# Patient Record
Sex: FEMALE | Race: BLACK OR AFRICAN AMERICAN | NOT HISPANIC OR LATINO | Employment: OTHER | URBAN - METROPOLITAN AREA
[De-identification: names, ages, dates, MRNs, and addresses within clinical notes are randomized per-mention and may not be internally consistent; named-entity substitution may affect disease eponyms.]

---

## 2018-02-06 ENCOUNTER — HOSPITAL ENCOUNTER (EMERGENCY)
Facility: OTHER | Age: 63
Discharge: HOME | End: 2018-02-06
Attending: EMERGENCY MEDICINE
Payer: MEDICARE

## 2018-02-06 VITALS
HEIGHT: 65 IN | TEMPERATURE: 98 F | RESPIRATION RATE: 16 BRPM | HEART RATE: 68 BPM | DIASTOLIC BLOOD PRESSURE: 59 MMHG | OXYGEN SATURATION: 97 % | WEIGHT: 167 LBS | BODY MASS INDEX: 27.82 KG/M2 | SYSTOLIC BLOOD PRESSURE: 128 MMHG

## 2018-02-06 DIAGNOSIS — M51.36 DDD (DEGENERATIVE DISC DISEASE), LUMBAR: ICD-10-CM

## 2018-02-06 DIAGNOSIS — M54.9 BACK PAIN: Primary | ICD-10-CM

## 2018-02-06 DIAGNOSIS — S20.229A CONTUSION OF BACK, UNSPECIFIED LATERALITY, INITIAL ENCOUNTER: ICD-10-CM

## 2018-02-06 PROCEDURE — 25000003 PHARM REV CODE 250: Performed by: PHYSICIAN ASSISTANT

## 2018-02-06 PROCEDURE — 99283 EMERGENCY DEPT VISIT LOW MDM: CPT

## 2018-02-06 RX ORDER — FLUTICASONE PROPIONATE 50 MCG
1 SPRAY, SUSPENSION (ML) NASAL DAILY
COMMUNITY
End: 2018-04-22

## 2018-02-06 RX ORDER — TRAMADOL HYDROCHLORIDE 50 MG/1
50 TABLET ORAL EVERY 6 HOURS PRN
Qty: 10 TABLET | Refills: 0 | Status: SHIPPED | OUTPATIENT
Start: 2018-02-06 | End: 2018-02-16

## 2018-02-06 RX ORDER — HYDROCODONE BITARTRATE AND ACETAMINOPHEN 5; 325 MG/1; MG/1
2 TABLET ORAL
Status: COMPLETED | OUTPATIENT
Start: 2018-02-06 | End: 2018-02-06

## 2018-02-06 RX ADMIN — HYDROCODONE BITARTRATE AND ACETAMINOPHEN 2 TABLET: 5; 325 TABLET ORAL at 02:02

## 2018-02-06 NOTE — ED NOTES
"Pt ambulatory with slow but steady gait with Xray technician. Pt refusing to get into wheelchair, stating "oh no that's too low."   "

## 2018-02-06 NOTE — ED NOTES
Appearance: Pt awake, alert & oriented to person, place & time. Pt in no acute distress at present time. Pt is clean and well groomed with clothes appropriately fastened.   Skin: Skin warm, dry & intact. Color consistent with ethnicity. Mucous membranes moist. No breakdown or brusing noted.   Musculoskeletal:SEE HPI. no obvious swelling or deformities noted.   Respiratory: Respirations spontaneous, even, and non-labored. Visible chest rise noted. Airway is open and patent. No accessory muscle use noted.   Neurologic: Sensation is intact. Speech is clear and appropriate. Eyes open spontaneously, behavior appropriate to situation, follows commands, facial expression symmetrical, bilateral hand grasp equal and even, purposeful motor response noted.  Cardiac: All peripheral pulses present. No Bilateral lower extremity edema. Cap refill is <3 seconds. Pt denies active chest pains, SOB, dizziness, blurred vision, weakness or fatigue at this time.   Abdomen: Abdomen soft, non-tender to palpation. Pt denies active abd pains, cramping or discomfort, No N/V/D at this time.   : Pt reports no dysuria or hematuria.

## 2018-02-06 NOTE — ED NOTES
"Pt reporting she was walking outside today, "slipped on something" and fell. Pt now with pain to bilateral lower back. No deformities to site. Pt denies hitting her head or LOC. Denies bladder or bowel dysfunction. Pt reporting difficulty walking. Pt AAOx4 and appropriate at this time. Respirations even and unlabored. No acute distress noted.  "

## 2018-02-07 NOTE — ED PROVIDER NOTES
Encounter Date: 2/6/2018       History     Chief Complaint   Patient presents with    Back Pain     pt slipped and fell to day  now with lower back pain .     Patient is a 63-year-old female with history of glaucoma who presents to the emergency department with back pain after a fall.  [place]  Patient states she slipped in water falling directly on her lower back.  She denies hitting her head or loss of consciousness.  She reports 10 out of 10 pain in her lower back.  She denies radiation of the pain.  She denies saddle anesthesia or bowel or bladder incontinence or retention.        The history is provided by the patient.     Review of patient's allergies indicates:   Allergen Reactions    Contrast media     Penicillins      Past Medical History:   Diagnosis Date    Glaucoma      Past Surgical History:   Procedure Laterality Date    FOOT SURGERY      TUBAL LIGATION      TUMOR REMOVAL Right     Right breast     No family history on file.  Social History   Substance Use Topics    Smoking status: Current Every Day Smoker    Smokeless tobacco: Never Used    Alcohol use No     Review of Systems   Constitutional: Negative for activity change, appetite change, chills, fatigue and fever.   HENT: Negative for congestion, ear discharge, ear pain, postnasal drip, rhinorrhea, sore throat and trouble swallowing.    Eyes: Negative for photophobia and visual disturbance.   Respiratory: Negative for cough and shortness of breath.    Cardiovascular: Negative for chest pain.   Gastrointestinal: Negative for abdominal pain, blood in stool, constipation, diarrhea, nausea and vomiting.   Genitourinary: Negative for dysuria, flank pain and hematuria.   Musculoskeletal: Positive for back pain. Negative for neck pain and neck stiffness.   Skin: Negative for rash and wound.   Neurological: Negative for dizziness, speech difficulty, weakness, light-headedness, numbness and headaches.        Physical Exam     Initial Vitals [02/06/18 1323]   BP Pulse Resp Temp SpO2   125/80 69 18 98.1 °F (36.7 °C) 96 %      MAP       95         Physical Exam    Nursing note and vitals reviewed.  Constitutional: She appears well-developed and well-nourished. She is not diaphoretic.  Non-toxic appearance. No distress.   HENT:   Head: Normocephalic.   Right Ear: Hearing and external ear normal.   Left Ear: Hearing and external ear normal.   Nose: Nose normal.   Mouth/Throat: Uvula is midline, oropharynx is clear and moist and mucous membranes are normal. Mucous membranes are not pale. No trismus in the jaw. No uvula swelling. No oropharyngeal exudate.   Eyes: Conjunctivae are normal. Pupils are equal, round, and reactive to light.   Neck: Normal range of motion.   Cardiovascular: Normal rate and regular rhythm.   Pulmonary/Chest: Breath sounds normal.   Abdominal: Soft. Bowel sounds are normal. There is no tenderness.   Musculoskeletal:   No midline tenderness in the cervical or thoracic region.  There is midline tenderness in the lumbar region.  No ecchymosis.  No step-offs or crepitus.  Bilateral paraspinal tenderness in the lumbar region.  Normal strength in upper and lower extremities.  No saddle anesthesia.  Pt is ambulating.   Lymphadenopathy:     She has no cervical adenopathy.   Neurological: She is alert and oriented to person, place, and time. She has normal strength. No cranial nerve deficit or sensory deficit.   Skin: Skin is warm and dry. Capillary refill takes less than 2 seconds.   Psychiatric: She has a normal mood and affect.         ED Course   Procedures  Labs Reviewed - No data to display       X-Rays:   Independently Interpreted Readings:   Other Readings:  Degenerative disc disease with no acute osseous injury.    Imaging Results          X-Ray Lumbar Spine Ap And Lateral (Final result)  Result time 02/06/18 14:20:36    Final result by Santos Kahn MD (02/06/18 14:20:36)                  Impression:        Mild degenerative changes of the lumbar spine without evidence of acute bony abnormality.      Electronically signed by: JORGITO BERRY MD  Date:     02/06/18  Time:    14:20              Narrative:    Comparison: None    Technique: Lumbar spine AP and lateral.    Findings: No evidence of significant compression fracture or bone destruction.  Mild multilevel intervertebral disc space narrowing with mild retrolisthesis of L5 on S1.  Mild facet arthropathy in the lower lumbar spine.  Mild sclerosis along the superior endplate of L2 may be degenerative.  No significant height loss.  There is mild levoscoliosis on AP view.  There is extensive aortic atherosclerosis.                              Medical Decision Making:   Initial Assessment:   Urgent evaluation of a 63-year-old female with history of glaucoma who presents to the emergency department after a fall.  Patient is afebrile, nontoxic appearing, and hemodynamically stable.  No head injury.  There is midline tenderness of spine.  X-rays obtained and shows no acute osseous injury.  No evidence of spinal cord compression or cauda equina syndrome.  Patient is given pain medication.  She is advised to follow-up with PCP.  She is advised to return to the emergency department with any worsening symptoms or concerns.  Other:   I have discussed this case with another health care provider.       <> Summary of the Discussion: Dr. Whitman                   ED Course      Clinical Impression:   The primary encounter diagnosis was Back pain. Diagnoses of Contusion of back, unspecified laterality, initial encounter and DDD (degenerative disc disease), lumbar were also pertinent to this visit.                           Magdalena Leroy PA-C  02/06/18 1104

## 2018-04-22 ENCOUNTER — HOSPITAL ENCOUNTER (EMERGENCY)
Facility: OTHER | Age: 63
Discharge: HOME | End: 2018-04-22
Attending: EMERGENCY MEDICINE
Payer: MEDICARE

## 2018-04-22 VITALS
WEIGHT: 163 LBS | SYSTOLIC BLOOD PRESSURE: 118 MMHG | DIASTOLIC BLOOD PRESSURE: 58 MMHG | TEMPERATURE: 98 F | HEIGHT: 65 IN | BODY MASS INDEX: 27.16 KG/M2 | OXYGEN SATURATION: 95 % | RESPIRATION RATE: 18 BRPM | HEART RATE: 65 BPM

## 2018-04-22 DIAGNOSIS — M54.9 ACUTE RIGHT-SIDED BACK PAIN, UNSPECIFIED BACK LOCATION: Primary | ICD-10-CM

## 2018-04-22 PROCEDURE — 96372 THER/PROPH/DIAG INJ SC/IM: CPT

## 2018-04-22 PROCEDURE — 99283 EMERGENCY DEPT VISIT LOW MDM: CPT | Mod: 25

## 2018-04-22 PROCEDURE — 63600175 PHARM REV CODE 636 W HCPCS: Performed by: EMERGENCY MEDICINE

## 2018-04-22 PROCEDURE — 25000003 PHARM REV CODE 250: Performed by: EMERGENCY MEDICINE

## 2018-04-22 RX ORDER — BENZONATATE 100 MG/1
100 CAPSULE ORAL 3 TIMES DAILY PRN
COMMUNITY

## 2018-04-22 RX ORDER — TRAMADOL HYDROCHLORIDE 50 MG/1
50 TABLET ORAL
Status: COMPLETED | OUTPATIENT
Start: 2018-04-22 | End: 2018-04-22

## 2018-04-22 RX ORDER — ACETAMINOPHEN 500 MG
500 TABLET ORAL EVERY 8 HOURS
COMMUNITY

## 2018-04-22 RX ORDER — ASPIRIN 81 MG/1
81 TABLET ORAL DAILY
COMMUNITY

## 2018-04-22 RX ORDER — OMEPRAZOLE 20 MG/1
20 CAPSULE, DELAYED RELEASE ORAL DAILY
COMMUNITY

## 2018-04-22 RX ORDER — METHOCARBAMOL 500 MG/1
1000 TABLET, FILM COATED ORAL
Status: COMPLETED | OUTPATIENT
Start: 2018-04-22 | End: 2018-04-22

## 2018-04-22 RX ORDER — KETOROLAC TROMETHAMINE 30 MG/ML
30 INJECTION, SOLUTION INTRAMUSCULAR; INTRAVENOUS
Status: COMPLETED | OUTPATIENT
Start: 2018-04-22 | End: 2018-04-22

## 2018-04-22 RX ORDER — GABAPENTIN 300 MG/1
300 CAPSULE ORAL 3 TIMES DAILY
COMMUNITY

## 2018-04-22 RX ORDER — IBUPROFEN 800 MG/1
800 TABLET ORAL 3 TIMES DAILY PRN
COMMUNITY

## 2018-04-22 RX ORDER — DICLOFENAC SODIUM 25 MG/1
25 TABLET, DELAYED RELEASE ORAL 3 TIMES DAILY PRN
Qty: 20 TABLET | Refills: 0 | Status: SHIPPED | OUTPATIENT
Start: 2018-04-22

## 2018-04-22 RX ORDER — METHOCARBAMOL 500 MG/1
1000 TABLET, FILM COATED ORAL 3 TIMES DAILY PRN
Qty: 20 TABLET | Refills: 0 | Status: SHIPPED | OUTPATIENT
Start: 2018-04-22 | End: 2018-04-27

## 2018-04-22 RX ADMIN — KETOROLAC TROMETHAMINE 30 MG: 30 INJECTION, SOLUTION INTRAMUSCULAR at 12:04

## 2018-04-22 RX ADMIN — TRAMADOL HYDROCHLORIDE 50 MG: 50 TABLET, FILM COATED ORAL at 11:04

## 2018-04-22 RX ADMIN — METHOCARBAMOL 1000 MG: 500 TABLET ORAL at 11:04

## 2018-04-22 NOTE — ED TRIAGE NOTES
Pt c/o right scapular & shoulder pain X 3 days which has progressively worsened. Pt also c/o decreased right shoulder ROM secondary to pain.

## 2018-04-22 NOTE — DISCHARGE INSTRUCTIONS
We have prescribed you medication for pain. Please fill and take as directed.    Please return to the ER if you have chest pain, difficulty breathing, fevers, altered mental status, dizziness, weakness, or any other concerns.      Follow up with your primary care physician.

## 2018-04-22 NOTE — ED NOTES
Patient Identifiers for Erin Kelley checked and correct  LOC: The patient is awake, alert and aware of environment with an appropriate affect, the patient is oriented x 3 and speaking appropriate.  APPEARANCE: Elderly. Patient grimacing with movement of the RUE. Pt uncomfortable. Pt is in no acute distress. The patient is clean and well groomed. The patient's clothing is properly fastened.  SKIN: The skin is warm and dry. The patient has normal skin turgor and moist mucus membranes. No rashes or lesions upon observation. Skin Intact , no breakdown noted.  Musculoskeletal :  Decreased right shoulder range of motion secondary to right scapular pain. No swelling. Palpation tenderness noted upon palpation. No obvious RUE or (R) shoulder deformity. Pt reports sensation of sensation of the RUE intact & WNL at present.  RESPIRATORY: Airway is open and patent, respirations are spontaneous, patient has a normal effort and rate.   PULSES: 2+ radial pulses, symmetrical in all extremities.    Will continue to monitor

## 2018-04-22 NOTE — ED PROVIDER NOTES
Encounter Date: 4/22/2018    SCRIBE #1 NOTE: I, Aleah Cotter, am scribing for, and in the presence of, Dr. French.       History     Chief Complaint   Patient presents with    Back Pain     pt c/o right scapular upper back pain worsening yesterday starting with muscle spasms. Fall x 2 months ago with back pain since fall.      Time seen by provider: 11:22 PM    This is a 63 y.o. female who presents with complaint of right-sided upper back pain that has worsened for the past three days. The patient reports that the pain radiates to her right shoulder down her RUE. She reports associated numbness and tingling that extends from the right shoulder to the right elbow. The patient notes that the pain is exacerbated by movement. The patient's  reports that the patient was moving heavy objects a few days before the onset of pain occurred. The patient denies having these symptoms in the past. The patient denies swelling, neck pain, fever, chills, chest pain, or SOB.       The history is provided by the patient and the spouse.     Review of patient's allergies indicates:   Allergen Reactions    Contrast media     Penicillins     Vicodin [hydrocodone-acetaminophen]      itching     Past Medical History:   Diagnosis Date    GERD (gastroesophageal reflux disease)     Glaucoma      Past Surgical History:   Procedure Laterality Date    FOOT SURGERY      TUBAL LIGATION      TUMOR REMOVAL Right     Right breast     History reviewed. No pertinent family history.  Social History   Substance Use Topics    Smoking status: Current Every Day Smoker    Smokeless tobacco: Never Used    Alcohol use No     Review of Systems   Constitutional: Negative for chills and fever.   Respiratory: Negative for shortness of breath.    Cardiovascular: Negative for chest pain.   Gastrointestinal: Negative for nausea and vomiting.   Musculoskeletal: Positive for arthralgias (right shoulder. right elbow.) and back pain (upper). Negative  for joint swelling and neck pain.        Positive for pain to the RUE.   Neurological: Positive for numbness (RUE.).        Positive for tingling to the RUE.       Physical Exam     Initial Vitals [04/22/18 1105]   BP Pulse Resp Temp SpO2   (!) 110/55 87 14 98.5 °F (36.9 °C) 95 %      MAP       73.33         Physical Exam    Nursing note and vitals reviewed.  Constitutional: She appears well-developed and well-nourished. She is cooperative.  Non-toxic appearance. No distress.   HENT:   Head: Normocephalic and atraumatic.   Eyes: Conjunctivae and EOM are normal.   Neck: Normal range of motion and full passive range of motion without pain. Neck supple.   Cardiovascular: Normal rate, regular rhythm, normal heart sounds and normal pulses.   2+ radial pulses bilaterally.   Pulmonary/Chest: Effort normal and breath sounds normal. No respiratory distress. She has no wheezes. She has no rales.   Abdominal: Normal appearance.   Musculoskeletal: Normal range of motion. She exhibits tenderness.   Tenderness to palpation along the medial edge of right scapula and posterior shoulder. Increased pain with abduction of right upper extremity past 90 degrees, though full ROM intact.  No midline C-spine tenderness to palpation.   Neurological: She is alert and oriented to person, place, and time. She has normal strength. No cranial nerve deficit or sensory deficit.   Sensations intact.  Ambulatory with steady gait.  5/5 strength in all extremities.     Skin: Skin is warm, dry and intact.   Psychiatric: Her speech is normal and behavior is normal.         ED Course   Procedures  Labs Reviewed - No data to display          Medical Decision Making:   History:   Old Medical Records: I decided to obtain old medical records.  Old Records Summarized: other records.  Initial Assessment:   11:22AM:  Pt is a 62 y/o F who presents to ED with R upper back pain with radiation down the RUE. Pt appears well, nontoxic. She does have tenderness just  medial to the R scapula and pain with RUE movement. She is neurovascularly intact.  I do not feel that imaging is indicated at this time.  Will plan for analgesia, will continue to follow and reassess.      1:05PM: Pt doing well, feeling better.  I do believe pain is likely MSK in nature, may be a radicular component for it.  I counseled pt regarding supportive care measures and will provide prescription for NSAIDs and muscle relaxants.  I have discussed with the pt ED return warnings and need for close PCP f/u.  Pt agreeable to plan and all questions answered.  I feel that pt is stable for discharge and management as an outpatient and no further intervention is needed at this time.  Pt is comfortable returning to the ED if needed.  Will DC home in stable condition.                Scribe Attestation:   Scribe #1: I performed the above scribed service and the documentation accurately describes the services I performed. I attest to the accuracy of the note.    Attending Attestation:           Physician Attestation for Scribe:  Physician Attestation Statement for Scribe #1: I, Dr. French, reviewed documentation, as scribed by Aleah Cotter in my presence, and it is both accurate and complete.                    Clinical Impression:     1. Acute right-sided back pain, unspecified back location                             Patt French MD  04/22/18 4515

## 2018-11-19 ENCOUNTER — HOSPITAL ENCOUNTER (EMERGENCY)
Facility: OTHER | Age: 63
Discharge: HOME | End: 2018-11-19
Attending: EMERGENCY MEDICINE
Payer: MEDICARE

## 2018-11-19 VITALS
HEART RATE: 56 BPM | HEIGHT: 65 IN | RESPIRATION RATE: 17 BRPM | WEIGHT: 168 LBS | BODY MASS INDEX: 27.99 KG/M2 | TEMPERATURE: 98 F | SYSTOLIC BLOOD PRESSURE: 100 MMHG | OXYGEN SATURATION: 97 % | DIASTOLIC BLOOD PRESSURE: 54 MMHG

## 2018-11-19 DIAGNOSIS — S01.81XA LACERATION OF FOREHEAD, INITIAL ENCOUNTER: ICD-10-CM

## 2018-11-19 DIAGNOSIS — S09.90XA CLOSED HEAD INJURY, INITIAL ENCOUNTER: Primary | ICD-10-CM

## 2018-11-19 PROCEDURE — 90715 TDAP VACCINE 7 YRS/> IM: CPT | Performed by: EMERGENCY MEDICINE

## 2018-11-19 PROCEDURE — 25000003 PHARM REV CODE 250: Performed by: EMERGENCY MEDICINE

## 2018-11-19 PROCEDURE — 99284 EMERGENCY DEPT VISIT MOD MDM: CPT

## 2018-11-19 PROCEDURE — 63600175 PHARM REV CODE 636 W HCPCS: Performed by: EMERGENCY MEDICINE

## 2018-11-19 PROCEDURE — 12013 RPR F/E/E/N/L/M 2.6-5.0 CM: CPT

## 2018-11-19 PROCEDURE — 90471 IMMUNIZATION ADMIN: CPT | Performed by: EMERGENCY MEDICINE

## 2018-11-19 RX ORDER — LIDOCAINE HYDROCHLORIDE AND EPINEPHRINE 10; 10 MG/ML; UG/ML
10 INJECTION, SOLUTION INFILTRATION; PERINEURAL
Status: COMPLETED | OUTPATIENT
Start: 2018-11-19 | End: 2018-11-19

## 2018-11-19 RX ADMIN — CLOSTRIDIUM TETANI TOXOID ANTIGEN (FORMALDEHYDE INACTIVATED), CORYNEBACTERIUM DIPHTHERIAE TOXOID ANTIGEN (FORMALDEHYDE INACTIVATED), BORDETELLA PERTUSSIS TOXOID ANTIGEN (GLUTARALDEHYDE INACTIVATED), BORDETELLA PERTUSSIS FILAMENTOUS HEMAGGLUTININ ANTIGEN (FORMALDEHYDE INACTIVATED), BORDETELLA PERTUSSIS PERTACTIN ANTIGEN, AND BORDETELLA PERTUSSIS FIMBRIAE 2/3 ANTIGEN 0.5 ML: 5; 2; 2.5; 5; 3; 5 INJECTION, SUSPENSION INTRAMUSCULAR at 02:11

## 2018-11-19 RX ADMIN — LIDOCAINE HYDROCHLORIDE,EPINEPHRINE BITARTRATE 10 ML: 10; .01 INJECTION, SOLUTION INFILTRATION; PERINEURAL at 02:11

## 2018-11-19 NOTE — ED NOTES
"Applied steri strips to pt. Pt uncooperative while cleaning off dried blood to forehead. Informed pt that the dried blood may make steri strips come off if it is not cleaned off. Pt states "i dont care its burning."   "

## 2018-11-19 NOTE — ED TRIAGE NOTES
Pt arrives to ED with c/o fall and laceration. Pt reports falling around 0100 on side walk and hitting head on ground, reports drinking, appears to be intoxicated, smells of ETOH. Pt agitated, anxious, and uncooperative at this time. Pt lying in ED bed, respirations even, unlabored, eyes open spontaneously, NAD noted, AAOX4. Pt placed on BP cycle, and pulse ox.

## 2018-11-19 NOTE — ED NOTES
Pt lying in bed comfortably, respirations even, unlabored, eyes open spontaneously, NAD noted. Pt updated on plan of care, Will continue to monitor with bp cycling and pulse ox

## 2018-11-19 NOTE — ED NOTES
Pt lying in bed on left side, respirations even, unlabored, appears to be sleeping, eyes closed, NAD noted, call bell within reach. will continue to monitor with BP cycling and pulse ox

## 2018-11-19 NOTE — ED PROVIDER NOTES
Encounter Date: 11/19/2018    SCRIBE #1 NOTE: I, Yeny Cohn, am scribing for, and in the presence of, Dr. Lee.       History     Chief Complaint   Patient presents with    Fall     +fall 20 min PTA. pt fell on concrete while out drinking with family. pt appears to be intoxicated but is aaox3. bleeding controlled upon arrival.      Time seen by provider: 1:31 AM    This is a 63 y.o. female who presents s/p fall that occurred twenty minutes prior to arrival. The patient states she tripped and fell onto concrete and hit her head. She reports a laceration to the forehead but denies loss of consciousness. She denies nausea, vomiting, headache, extremity pain, neck pain, dizziness, light headedness, numbness or weakness. Patient admits to use of alcohol tonight.      The history is provided by the patient.     Review of patient's allergies indicates:   Allergen Reactions    Contrast media     Penicillins     Vicodin [hydrocodone-acetaminophen]      itching     Past Medical History:   Diagnosis Date    GERD (gastroesophageal reflux disease)     Glaucoma      Past Surgical History:   Procedure Laterality Date    FOOT SURGERY      TUBAL LIGATION      TUMOR REMOVAL Right     Right breast     No family history on file.  Social History     Tobacco Use    Smoking status: Current Every Day Smoker    Smokeless tobacco: Never Used   Substance Use Topics    Alcohol use: No    Drug use: No     Review of Systems   Constitutional: Negative for chills and fever.   HENT: Negative for congestion, rhinorrhea, sore throat and trouble swallowing.    Eyes: Negative for visual disturbance.   Respiratory: Negative for shortness of breath.    Cardiovascular: Negative for chest pain.   Gastrointestinal: Negative for abdominal pain, nausea and vomiting.   Endocrine: Negative for polyuria.   Genitourinary: Negative for difficulty urinating and dysuria.   Musculoskeletal: Negative for back pain and neck pain.   Skin: Positive for  wound.   Neurological: Negative for syncope, weakness, numbness and headaches.   Psychiatric/Behavioral: Negative for confusion.       Physical Exam     Initial Vitals   BP Pulse Resp Temp SpO2   11/19/18 0136 11/19/18 0132 11/19/18 0136 11/19/18 0136 11/19/18 0132   (!) 122/56 66 18 97.6 °F (36.4 °C) 100 %      MAP       --                Physical Exam    Nursing note and vitals reviewed.  Constitutional: She appears well-developed and well-nourished. No distress.   HENT:   Head: Normocephalic.   Right Ear: External ear normal.   Left Ear: External ear normal.   3 cm laceration right forehead. Doesn't involve the eyebrow.   Abrasion to bridge of nose. No lacerations.    Eyes: Pupils are equal, round, and reactive to light. Right eye exhibits nystagmus. Left eye exhibits nystagmus.   Neck: Normal range of motion. Neck supple.   Cardiovascular: Normal rate, regular rhythm and normal heart sounds. Exam reveals no gallop and no friction rub.    No murmur heard.  Pulmonary/Chest: Breath sounds normal. No respiratory distress. She has no wheezes. She has no rhonchi. She has no rales.   Abdominal: Soft. There is no tenderness. There is no rebound and no guarding.   Musculoskeletal: Normal range of motion. She exhibits no edema or tenderness.   Moves all extremities without pain.   Neurological: She is alert and oriented to person, place, and time. She has normal strength. GCS score is 15. GCS eye subscore is 4. GCS verbal subscore is 5. GCS motor subscore is 6.   Slurred speech.   Skin:   No skin rashes or lesions below neck.         ED Course   Lac Repair  Date/Time: 11/19/2018 5:44 AM  Performed by: Rodger Lee MD  Authorized by: Rodger Lee MD   Body area: head/neck  Location details: forehead  Laceration length: 3 cm  Foreign bodies: no foreign bodies  Tendon involvement: none  Nerve involvement: none  Vascular damage: no  Skin closure: Steri-Strips  Dressing: 4x4 sterile gauze  Patient tolerance: Patient  tolerated the procedure well with no immediate complications  Comments: Patient refuses sutures due to a fear of needles .        Labs Reviewed - No data to display       Imaging Results          CT Head Without Contrast (Final result)  Result time 11/19/18 02:22:52    Final result by Aureliano Bernal MD (11/19/18 02:22:52)                 Impression:      Frontal scalp soft tissue injury.  No CT evidence of acute intracranial abnormality.      Electronically signed by: Aureliano Bernal MD  Date:    11/19/2018  Time:    02:22             Narrative:    EXAMINATION:  CT HEAD WITHOUT CONTRAST    CLINICAL HISTORY:  Head trauma, minor, GCS>=13, NOC/NEXUS/CCR positive, first study;    TECHNIQUE:  Low dose axial images were obtained through the head.  Coronal and sagittal reformations were also performed. Contrast was not administered.    COMPARISON:  None.    FINDINGS:  No evidence of acute territorial infarct, hemorrhage, mass effect, or midline shift.    Ventricles are normal in size and configuration.    There is mild right frontal scalp soft tissue injury and trace subcutaneous emphysema suggestive of laceration.  No displaced calvarial fracture.    Visualized paranasal sinuses and mastoid air cells are clear.                                 Medical Decision Making:   Clinical Tests:   Radiological Study: Ordered and Reviewed  ED Management:  Intoxicated patient presents with a head injury and forehead laceration after she fell.  CT scan of her head is negative. She has no other injuries on thorough examination. She does have a sizable laceration on her forehead.  It is cleaned but the patient is very agitated about not receiving sutures..  We allow her to rest several hours then re-evaluate.  She continues to refuse any sutures as she is afraid of needles.  I have discussed with the possibility for increased risk of scarring and infection without sutures.  She expresses understanding with this, acknowledges the  risk, but cannot stand the idea of having stitches.  Nursing places Steri-Strips to approximate the closure and covered with gauze.  Patient has a sober friend who can come pick her up.    I did have an extensive talk regarding signs to return for and need for follow up. Patient expressed understanding and will monitor symptoms closely and follow-up as needed.    MANDA Lee M.D.  11/19/2018  5:46 AM              Scribe Attestation:   Scribe #1: I performed the above scribed service and the documentation accurately describes the services I performed. I attest to the accuracy of the note.    Attending Attestation:           Physician Attestation for Scribe:  Physician Attestation Statement for Scribe #1: I, Dr. Lee, reviewed documentation, as scribed by Yeny Cohn in my presence, and it is both accurate and complete.                    Clinical Impression:     1. Closed head injury, initial encounter    2. Laceration of forehead, initial encounter                                 Rodger Lee MD  11/19/18 9882

## 2018-11-19 NOTE — ED NOTES
Pt lying in bed, respirations even, unlabored, eyes open spontaneously, call bell within reach, visitor at bedside. Pt updated on plan of care, will continue to monitor with BP cycling and pulse ox

## 2018-11-19 NOTE — ED NOTES
Pt arouses to repeated verbal stimuli, oriented to place and person and re-oriented to time,place and situation.  Pt falls fast asleep after 5 seconds.  Will continue to monitor.

## 2018-11-19 NOTE — ED NOTES
"Pt requesting to go to the bathroom. While assisting pt to bathroom, pt sat on ground, refused to stand up, stating "i am weak." assisted up by 2 RNs and assisted to bed. Ride rails up X2, call bell within reach, visitor at bedside.  "

## 2018-11-19 NOTE — ED NOTES
at bedside, pt sitting up in bed drinking apple juice, eating sandwich, oriented x's 4, skin warm and dry, speech clear and steady gait, will discharge to home

## 2020-06-12 DIAGNOSIS — Z72.0 TOBACCO USER: ICD-10-CM

## 2020-06-12 DIAGNOSIS — F17.200 TOBACCO USE DISORDER: Primary | ICD-10-CM

## 2020-11-13 ENCOUNTER — LAB VISIT (OUTPATIENT)
Dept: LAB | Facility: OTHER | Age: 65
End: 2020-11-13
Payer: MEDICARE

## 2020-11-13 DIAGNOSIS — Z03.818 ENCOUNTER FOR OBSERVATION FOR SUSPECTED EXPOSURE TO OTHER BIOLOGICAL AGENTS RULED OUT: ICD-10-CM

## 2020-11-13 PROCEDURE — U0003 INFECTIOUS AGENT DETECTION BY NUCLEIC ACID (DNA OR RNA); SEVERE ACUTE RESPIRATORY SYNDROME CORONAVIRUS 2 (SARS-COV-2) (CORONAVIRUS DISEASE [COVID-19]), AMPLIFIED PROBE TECHNIQUE, MAKING USE OF HIGH THROUGHPUT TECHNOLOGIES AS DESCRIBED BY CMS-2020-01-R: HCPCS

## 2020-11-15 LAB — SARS-COV-2 RNA RESP QL NAA+PROBE: NOT DETECTED

## 2021-03-15 DIAGNOSIS — R29.898 WEAKNESS OF RIGHT UPPER EXTREMITY: ICD-10-CM

## 2021-03-15 DIAGNOSIS — M79.601 PAIN OF RIGHT UPPER EXTREMITY: Primary | ICD-10-CM

## 2021-03-30 ENCOUNTER — PROCEDURE VISIT (OUTPATIENT)
Dept: NEUROLOGY | Facility: CLINIC | Age: 66
End: 2021-03-30
Payer: MEDICARE

## 2021-03-30 DIAGNOSIS — R29.898 WEAKNESS OF RIGHT UPPER EXTREMITY: ICD-10-CM

## 2021-03-30 DIAGNOSIS — M79.601 PAIN OF RIGHT UPPER EXTREMITY: ICD-10-CM

## 2021-03-30 PROCEDURE — 95913 NRV CNDJ TEST 13/> STUDIES: CPT | Mod: S$GLB,,, | Performed by: PSYCHIATRY & NEUROLOGY

## 2021-03-30 PROCEDURE — 95913 PR NERVE CONDUCTION STUDY; 13 OR MORE STUDIES: ICD-10-PCS | Mod: S$GLB,,, | Performed by: PSYCHIATRY & NEUROLOGY

## 2021-04-26 ENCOUNTER — PATIENT MESSAGE (OUTPATIENT)
Dept: RESEARCH | Facility: HOSPITAL | Age: 66
End: 2021-04-26

## 2022-05-02 ENCOUNTER — HOSPITAL ENCOUNTER (OUTPATIENT)
Facility: OTHER | Age: 67
Discharge: HOME | End: 2022-05-03
Attending: EMERGENCY MEDICINE | Admitting: EMERGENCY MEDICINE
Payer: MEDICARE

## 2022-05-02 DIAGNOSIS — M54.9 MUSCULOSKELETAL BACK PAIN: Primary | ICD-10-CM

## 2022-05-02 DIAGNOSIS — T88.7XXA MEDICATION SIDE EFFECT: ICD-10-CM

## 2022-05-02 LAB
ALBUMIN SERPL BCP-MCNC: 3.7 G/DL (ref 3.5–5.2)
ALP SERPL-CCNC: 89 U/L (ref 55–135)
ALT SERPL W/O P-5'-P-CCNC: 13 U/L (ref 10–44)
ANION GAP SERPL CALC-SCNC: 12 MMOL/L (ref 8–16)
AST SERPL-CCNC: 18 U/L (ref 10–40)
BASOPHILS # BLD AUTO: 0.03 K/UL (ref 0–0.2)
BASOPHILS NFR BLD: 0.4 % (ref 0–1.9)
BILIRUB SERPL-MCNC: 0.5 MG/DL (ref 0.1–1)
BUN SERPL-MCNC: 12 MG/DL (ref 8–23)
CALCIUM SERPL-MCNC: 9.4 MG/DL (ref 8.7–10.5)
CHLORIDE SERPL-SCNC: 106 MMOL/L (ref 95–110)
CK SERPL-CCNC: 190 U/L (ref 20–180)
CO2 SERPL-SCNC: 20 MMOL/L (ref 23–29)
CREAT SERPL-MCNC: 0.7 MG/DL (ref 0.5–1.4)
CTP QC/QA: YES
DIFFERENTIAL METHOD: ABNORMAL
EOSINOPHIL # BLD AUTO: 0.1 K/UL (ref 0–0.5)
EOSINOPHIL NFR BLD: 0.8 % (ref 0–8)
ERYTHROCYTE [DISTWIDTH] IN BLOOD BY AUTOMATED COUNT: 13.1 % (ref 11.5–14.5)
EST. GFR  (AFRICAN AMERICAN): >60 ML/MIN/1.73 M^2
EST. GFR  (NON AFRICAN AMERICAN): >60 ML/MIN/1.73 M^2
GLUCOSE SERPL-MCNC: 91 MG/DL (ref 70–110)
HCT VFR BLD AUTO: 37.2 % (ref 37–48.5)
HGB BLD-MCNC: 12.1 G/DL (ref 12–16)
IMM GRANULOCYTES # BLD AUTO: 0.01 K/UL (ref 0–0.04)
IMM GRANULOCYTES NFR BLD AUTO: 0.1 % (ref 0–0.5)
LYMPHOCYTES # BLD AUTO: 2 K/UL (ref 1–4.8)
LYMPHOCYTES NFR BLD: 27.5 % (ref 18–48)
MCH RBC QN AUTO: 31.6 PG (ref 27–31)
MCHC RBC AUTO-ENTMCNC: 32.5 G/DL (ref 32–36)
MCV RBC AUTO: 97 FL (ref 82–98)
MONOCYTES # BLD AUTO: 0.8 K/UL (ref 0.3–1)
MONOCYTES NFR BLD: 11.7 % (ref 4–15)
NEUTROPHILS # BLD AUTO: 4.3 K/UL (ref 1.8–7.7)
NEUTROPHILS NFR BLD: 59.5 % (ref 38–73)
NRBC BLD-RTO: 0 /100 WBC
PLATELET # BLD AUTO: 257 K/UL (ref 150–450)
PMV BLD AUTO: 11 FL (ref 9.2–12.9)
POTASSIUM SERPL-SCNC: 4.3 MMOL/L (ref 3.5–5.1)
PROT SERPL-MCNC: 7.5 G/DL (ref 6–8.4)
RBC # BLD AUTO: 3.83 M/UL (ref 4–5.4)
SARS-COV-2 RDRP RESP QL NAA+PROBE: NEGATIVE
SODIUM SERPL-SCNC: 138 MMOL/L (ref 136–145)
TROPONIN I SERPL DL<=0.01 NG/ML-MCNC: 0.01 NG/ML (ref 0–0.03)
TROPONIN I SERPL DL<=0.01 NG/ML-MCNC: 0.01 NG/ML (ref 0–0.03)
WBC # BLD AUTO: 7.19 K/UL (ref 3.9–12.7)

## 2022-05-02 PROCEDURE — 84484 ASSAY OF TROPONIN QUANT: CPT | Performed by: PHYSICIAN ASSISTANT

## 2022-05-02 PROCEDURE — 84484 ASSAY OF TROPONIN QUANT: CPT | Mod: 91 | Performed by: PHYSICIAN ASSISTANT

## 2022-05-02 PROCEDURE — 25000003 PHARM REV CODE 250: Performed by: PHYSICIAN ASSISTANT

## 2022-05-02 PROCEDURE — 80053 COMPREHEN METABOLIC PANEL: CPT | Performed by: PHYSICIAN ASSISTANT

## 2022-05-02 PROCEDURE — 93010 ELECTROCARDIOGRAM REPORT: CPT | Mod: ,,, | Performed by: INTERNAL MEDICINE

## 2022-05-02 PROCEDURE — 85025 COMPLETE CBC W/AUTO DIFF WBC: CPT | Performed by: PHYSICIAN ASSISTANT

## 2022-05-02 PROCEDURE — 93005 ELECTROCARDIOGRAM TRACING: CPT

## 2022-05-02 PROCEDURE — 93010 EKG 12-LEAD: ICD-10-PCS | Mod: ,,, | Performed by: INTERNAL MEDICINE

## 2022-05-02 PROCEDURE — G0378 HOSPITAL OBSERVATION PER HR: HCPCS

## 2022-05-02 PROCEDURE — 82550 ASSAY OF CK (CPK): CPT | Performed by: PHYSICIAN ASSISTANT

## 2022-05-02 PROCEDURE — 99285 EMERGENCY DEPT VISIT HI MDM: CPT | Mod: 25

## 2022-05-02 PROCEDURE — 94761 N-INVAS EAR/PLS OXIMETRY MLT: CPT

## 2022-05-02 PROCEDURE — 96360 HYDRATION IV INFUSION INIT: CPT

## 2022-05-02 PROCEDURE — U0002 COVID-19 LAB TEST NON-CDC: HCPCS | Performed by: PHYSICIAN ASSISTANT

## 2022-05-02 RX ORDER — TALC
6 POWDER (GRAM) TOPICAL NIGHTLY PRN
Status: DISCONTINUED | OUTPATIENT
Start: 2022-05-02 | End: 2022-05-03 | Stop reason: HOSPADM

## 2022-05-02 RX ORDER — ASPIRIN 81 MG/1
81 TABLET ORAL DAILY
Status: DISCONTINUED | OUTPATIENT
Start: 2022-05-03 | End: 2022-05-03 | Stop reason: HOSPADM

## 2022-05-02 RX ORDER — KETOROLAC TROMETHAMINE 30 MG/ML
15 INJECTION, SOLUTION INTRAMUSCULAR; INTRAVENOUS EVERY 6 HOURS PRN
Status: DISCONTINUED | OUTPATIENT
Start: 2022-05-02 | End: 2022-05-02

## 2022-05-02 RX ORDER — ALBUTEROL SULFATE 90 UG/1
AEROSOL, METERED RESPIRATORY (INHALATION)
COMMUNITY
Start: 2022-04-19

## 2022-05-02 RX ORDER — KETOROLAC TROMETHAMINE 10 MG/1
10 TABLET, FILM COATED ORAL EVERY 6 HOURS PRN
Status: DISCONTINUED | OUTPATIENT
Start: 2022-05-02 | End: 2022-05-03 | Stop reason: HOSPADM

## 2022-05-02 RX ORDER — GABAPENTIN 300 MG/1
300 CAPSULE ORAL 3 TIMES DAILY PRN
Status: DISCONTINUED | OUTPATIENT
Start: 2022-05-02 | End: 2022-05-03 | Stop reason: HOSPADM

## 2022-05-02 RX ORDER — SODIUM CHLORIDE 9 MG/ML
1000 INJECTION, SOLUTION INTRAVENOUS CONTINUOUS
Status: ACTIVE | OUTPATIENT
Start: 2022-05-02 | End: 2022-05-03

## 2022-05-02 RX ORDER — BUPROPION HYDROCHLORIDE 150 MG/1
TABLET, EXTENDED RELEASE ORAL
COMMUNITY
Start: 2022-01-04

## 2022-05-02 RX ORDER — AZELASTINE 1 MG/ML
1 SPRAY, METERED NASAL 2 TIMES DAILY
COMMUNITY
Start: 2022-03-08

## 2022-05-02 RX ORDER — ACETAMINOPHEN 325 MG/1
650 TABLET ORAL EVERY 8 HOURS PRN
Status: DISCONTINUED | OUTPATIENT
Start: 2022-05-02 | End: 2022-05-03 | Stop reason: HOSPADM

## 2022-05-02 RX ORDER — LIDOCAINE 50 MG/G
1 PATCH TOPICAL
Status: DISCONTINUED | OUTPATIENT
Start: 2022-05-02 | End: 2022-05-03 | Stop reason: HOSPADM

## 2022-05-02 RX ORDER — ERGOCALCIFEROL 1.25 MG/1
50000 CAPSULE ORAL
COMMUNITY
Start: 2022-04-26

## 2022-05-02 RX ORDER — SODIUM CHLORIDE 0.9 % (FLUSH) 0.9 %
10 SYRINGE (ML) INJECTION
Status: DISCONTINUED | OUTPATIENT
Start: 2022-05-02 | End: 2022-05-03 | Stop reason: HOSPADM

## 2022-05-02 RX ORDER — TIZANIDINE 2 MG/1
2 TABLET ORAL 3 TIMES DAILY
COMMUNITY
Start: 2022-04-08

## 2022-05-02 RX ORDER — PANTOPRAZOLE SODIUM 40 MG/1
40 TABLET, DELAYED RELEASE ORAL DAILY
COMMUNITY
Start: 2022-04-20

## 2022-05-02 RX ORDER — PANTOPRAZOLE SODIUM 40 MG/1
40 TABLET, DELAYED RELEASE ORAL DAILY
Status: DISCONTINUED | OUTPATIENT
Start: 2022-05-03 | End: 2022-05-03 | Stop reason: HOSPADM

## 2022-05-02 RX ORDER — TIMOLOL MALEATE 5 MG/ML
SOLUTION/ DROPS OPHTHALMIC
COMMUNITY
Start: 2022-04-19

## 2022-05-02 RX ORDER — DICLOFENAC SODIUM 10 MG/G
GEL TOPICAL 4 TIMES DAILY
COMMUNITY
Start: 2022-02-01

## 2022-05-02 RX ORDER — CYCLOBENZAPRINE HCL 10 MG
10 TABLET ORAL 3 TIMES DAILY PRN
Status: DISCONTINUED | OUTPATIENT
Start: 2022-05-02 | End: 2022-05-03 | Stop reason: HOSPADM

## 2022-05-02 RX ORDER — ATORVASTATIN CALCIUM 20 MG/1
20 TABLET, FILM COATED ORAL DAILY
COMMUNITY
Start: 2022-04-27 | End: 2022-05-02

## 2022-05-02 RX ADMIN — CYCLOBENZAPRINE 10 MG: 10 TABLET, FILM COATED ORAL at 08:05

## 2022-05-02 RX ADMIN — SODIUM CHLORIDE 1000 ML: 0.9 INJECTION, SOLUTION INTRAVENOUS at 07:05

## 2022-05-02 NOTE — ED TRIAGE NOTES
Pt states she was given lipitor by her doctor without her knowing, pt states she read up on the side effects and doesn't like the side effects it gives. Pt reporting pain to the neck and down under her left breast. Pt is alert and oriented, ambulatory, respirations even, unlabored. Will continue to monitor.

## 2022-05-02 NOTE — H&P
ED Observation Unit  History and Physical      I assumed care of this patient from the Main ED at onset of observation time, 5:16 PM on 05/02/2022.       History of Present Illness:    Erin Kelley is a 67 y.o. female with PMH of glaucoma and GERD presenting with  possible allergic reaction.  Patient states that she had read side effect warnings of statins and hence did not want to try.  She states that her doctor wrote her for this.  She states that she had 2 doses.  She states since this time she noted some left upper back pain that radiated to left chest.  She also reported possible rash to right forearm.  She took Benadryl shortly before arrival with improvement rash.  She does continue with left upper back pain.  Denies any recent falls or trauma.  States that she was concerned about possible cause of her heart given the radiation.  Denies any shortness of breath, nausea, vomiting or diaphoresis.  She is a daily smoker         I reviewed the ED Provider Note dated 05/02/2022  prior to my evaluation of this patient.  I reviewed all labs and imaging performed in the Main ED, prior to patient being placed in Observation. Patient was placed in the ED Observation Unit for upper back pain; atypical chest pain    Initial workup reveals mildly elevated CPK.  Chest x-ray with mild congestion.  No overt cardiomegaly.  Initial troponin unremarkable. Modest improvement in pain    PMHx   Past Medical History:   Diagnosis Date    GERD (gastroesophageal reflux disease)     Glaucoma       Past Surgical History:   Procedure Laterality Date    FOOT SURGERY      TUBAL LIGATION      TUMOR REMOVAL Right     Right breast        Family Hx   History reviewed. No pertinent family history.     Social Hx   Social History     Socioeconomic History    Marital status: Unknown   Tobacco Use    Smoking status: Current Every Day Smoker    Smokeless tobacco: Never Used   Substance and Sexual Activity    Alcohol use: No    Drug use:  No        Vital Signs   Vitals:    05/02/22 1324   BP: 106/66   Pulse: 60   Resp: 18   Temp: 98.6 °F (37 °C)   SpO2: 100%        Review of Systems  ROS: As per HPI and below:  Constitutional: No fever.  No chills.  Eyes: No visual changes.   ENT: No sore throat. No ear pain.  Urinary: No abnormal urination.  Cardiac:  Left-sided chest pain  Respiratory:  No shortness of breath, no wheezing  MSK:  Left upper back pain; radiates to left chest  Integument: No rashes or lesions.      Physical Exam  /66   Pulse 60   Temp 98.6 °F (37 °C)   Resp 18   SpO2 100%   Nursing note and vital signs reviewed.  Constitutional: No acute distress.  Eyes: No conjunctival injection.  Extraocular muscles are intact.  ENT: Normal phonation.  Musculoskeletal:  Tenderness palpation of left trapezius muscle with some edema noted.  No midline tenderness or step-off.  No rash appreciated.  No obvious bony deformity.  Cardiac:  Heart regular rate rhythm  Respiratory:  Lungs CTA  Skin: No rashes seen.  Good turgor.  No abrasions.  No ecchymoses.  Psych: Appropriate, conversant.      Medications:   Scheduled Meds:   [START ON 5/3/2022] aspirin  81 mg Oral Daily    LIDOcaine  1 patch Transdermal Q24H    [START ON 5/3/2022] pantoprazole  40 mg Oral Daily     Continuous Infusions:  PRN Meds:.acetaminophen, cyclobenzaprine, gabapentin, ketorolac, melatonin, sodium chloride 0.9%      Assessment/Plan:  1. Left-sided chest pain    2. Pain    3. Left-sided back pain      1. Left-sided chest pain-atypical likely musculoskeletal.  Plan for serial troponin given risk factors. Mild perihilar congestion on chest x-ray although no signs of cardiomegaly.  Plan to discharge home with serial troponins unremarkable    2. Left-sided back pain-likely muscular in etiology.  Plan for symptomatic medications and lidocaine patch.  Plan for symptomatic medications if workup unremarkable

## 2022-05-02 NOTE — Clinical Note
Diagnosis: Left-sided back pain [[id number]]   Future Attending Provider: GRACIELA CARUSO II [[id number]]   Is the patient being sent to ED Observation?: Yes   Admitting Provider:: GRACIELA CARUSO II [[id number]]   Special Needs:: No Special Needs [1]

## 2022-05-02 NOTE — ED PROVIDER NOTES
Source of History:  patient    Chief complaint:  Allergic Reaction (Started taking atorvastatin last week, although she has a known allergy. Pt reports itching, took 25MG of benadryl this morning )      HPI:  Erin Kelley is a 67 y.o. female with PMH of glaucoma and GERD presenting with  possible allergic reaction.  Patient states that she had read side effect warnings of statins and hence did not want to try.  She states that her doctor wrote her for this.  She states that she had 2 doses.  She states since this time she noted some left upper back pain that radiated to left chest.  She also reported possible rash to right forearm.  She took Benadryl shortly before arrival with improvement rash.  She does continue with left upper back pain.  Denies any recent falls or trauma.  States that she was concerned about possible cause of her heart given the radiation.  Denies any shortness of breath, nausea, vomiting or diaphoresis.  She is a daily smoker    This is the extent to the patients complaints today here in the emergency department.    ROS: As per HPI and below:  Constitutional: No fever.  No chills.  Eyes: No visual changes.   ENT: No sore throat. No ear pain.  Urinary: No abnormal urination.  Cardiac:  Left-sided chest pain  Respiratory:  No shortness of breath, no wheezing  MSK:  Left upper back pain; radiates to left chest  Integument: No rashes or lesions.    Review of patient's allergies indicates:   Allergen Reactions    Contrast media     Penicillins     Vicodin [hydrocodone-acetaminophen]      itching       PMH:  As per HPI and below:  Past Medical History:   Diagnosis Date    GERD (gastroesophageal reflux disease)     Glaucoma      Past Surgical History:   Procedure Laterality Date    FOOT SURGERY      TUBAL LIGATION      TUMOR REMOVAL Right     Right breast       Social History     Tobacco Use    Smoking status: Current Every Day Smoker    Smokeless tobacco: Never Used   Substance Use  Topics    Alcohol use: No    Drug use: No       Physical Exam:    /66   Pulse 60   Temp 98.6 °F (37 °C)   Resp 18   SpO2 100%   Nursing note and vital signs reviewed.  Constitutional: No acute distress.  Eyes: No conjunctival injection.  Extraocular muscles are intact.  ENT: Normal phonation.  Musculoskeletal:  Tenderness palpation of left trapezius muscle with some edema noted.  No midline tenderness or step-off.  No rash appreciated.  No obvious bony deformity.  Cardiac:  Heart regular rate rhythm  Respiratory:  Lungs CTA  Skin: No rashes seen.  Good turgor.  No abrasions.  No ecchymoses.  Psych: Appropriate, conversant.        I decided to obtain the patient's medical records.    Results for orders placed or performed during the hospital encounter of 05/02/22   CBC auto differential   Result Value Ref Range    WBC 7.19 3.90 - 12.70 K/uL    RBC 3.83 (L) 4.00 - 5.40 M/uL    Hemoglobin 12.1 12.0 - 16.0 g/dL    Hematocrit 37.2 37.0 - 48.5 %    MCV 97 82 - 98 fL    MCH 31.6 (H) 27.0 - 31.0 pg    MCHC 32.5 32.0 - 36.0 g/dL    RDW 13.1 11.5 - 14.5 %    Platelets 257 150 - 450 K/uL    MPV 11.0 9.2 - 12.9 fL    Immature Granulocytes 0.1 0.0 - 0.5 %    Gran # (ANC) 4.3 1.8 - 7.7 K/uL    Immature Grans (Abs) 0.01 0.00 - 0.04 K/uL    Lymph # 2.0 1.0 - 4.8 K/uL    Mono # 0.8 0.3 - 1.0 K/uL    Eos # 0.1 0.0 - 0.5 K/uL    Baso # 0.03 0.00 - 0.20 K/uL    nRBC 0 0 /100 WBC    Gran % 59.5 38.0 - 73.0 %    Lymph % 27.5 18.0 - 48.0 %    Mono % 11.7 4.0 - 15.0 %    Eosinophil % 0.8 0.0 - 8.0 %    Basophil % 0.4 0.0 - 1.9 %    Differential Method Automated    Comprehensive metabolic panel   Result Value Ref Range    Sodium 138 136 - 145 mmol/L    Potassium 4.3 3.5 - 5.1 mmol/L    Chloride 106 95 - 110 mmol/L    CO2 20 (L) 23 - 29 mmol/L    Glucose 91 70 - 110 mg/dL    BUN 12 8 - 23 mg/dL    Creatinine 0.7 0.5 - 1.4 mg/dL    Calcium 9.4 8.7 - 10.5 mg/dL    Total Protein 7.5 6.0 - 8.4 g/dL    Albumin 3.7 3.5 - 5.2 g/dL     Total Bilirubin 0.5 0.1 - 1.0 mg/dL    Alkaline Phosphatase 89 55 - 135 U/L    AST 18 10 - 40 U/L    ALT 13 10 - 44 U/L    Anion Gap 12 8 - 16 mmol/L    eGFR if African American >60 >60 mL/min/1.73 m^2    eGFR if non African American >60 >60 mL/min/1.73 m^2   CPK   Result Value Ref Range     (H) 20 - 180 U/L   Troponin I   Result Value Ref Range    Troponin I 0.006 0.000 - 0.026 ng/mL   POCT COVID-19 Rapid Screening   Result Value Ref Range    POC Rapid COVID Negative Negative     Acceptable Yes      Imaging Results          X-Ray Chest AP Portable (Final result)  Result time 05/02/22 14:06:40    Final result by Margaret Leyva MD (05/02/22 14:06:40)                 Impression:      Prominent perihilar pulmonary vasculature without overt edema.      Electronically signed by: Margaret Leyva  Date:    05/02/2022  Time:    14:06             Narrative:    EXAMINATION:  XR CHEST AP PORTABLE    CLINICAL HISTORY:  Pain, unspecified    TECHNIQUE:  Single frontal view of the chest was performed.    COMPARISON:  None    FINDINGS:  Lungs are well expanded.  No acute consolidation, pleural effusion, or pneumothorax.    Mildly prominent right hilar contour may represent pulmonary vasculature due to rotation of the patient.  Mild perihilar pulmonary vascular congestion.    Cardiac silhouette is normal in size.                                MDM:     Erin Kelley 67 y.o. presented to the ED with c/o left upper back and chest pain Physical exam reveals patient well-appearing in no distress.  No rash appreciated.  Tenderness palpation of left trapezius muscle.  No midline tenderness.  No crepitus or obvious bony deformity of chest.  Lungs CTA; heart regular rate rhythm    Differential Diagnosis includes, but is not limited to:  ACS/MI, PE, aortic dissection, pneumothorax, cardiac tamponade, pericarditis/myocarditis, pneumonia, infection/abscess, lung mass, trauma/fracture, costochondritis/pleurisy, MSK  pain/contusion, GERD, biliary disease, pancreatitis, anemia, myalgias, adverse reaction to medication    ED management:  Labs given location of pain although low suspicion of true allergic reaction.  Could be related to muscle breakdown related to statins although lower suspicion giving the unilateral component of it.  More likely related to muscle spasm however as she does have some radiation to chest and has some cardiac risk factors will obtain EKG and chest x-ray.  Chest x-ray with some subtle congestion although no obvious cardiomegaly.  CPK mildly elevated.  EKG with nonspecific ST wave changes.  No previous to compare.  Did discuss all results with patient that symptoms were likely related to muscle spasm and will treat as such however given the atypical presentation and risk factors will plan for observation with serial troponins.  Patient is agreeable plan.  Did discuss with provider in observation unit and she is agreeable to placed in observation.      ED Course as of 05/03/22 1130   Mon May 02, 2022   1754 Sinus rhythm at a rate of 62 beats per minute with short RI.  T-wave inversion to inferior leads, V4 through V6.  No STEMI.  No prior EKG to compare to. [AG]      ED Course User Index  [AG] Shiraz Becerra PA-C            Diagnostic Impression:    1. Left-sided chest pain    2. Pain    3. Left-sided back pain         ED Disposition Condition    Observation                 DAGOBERTO Walter  05/02/22 1708           DAGOBERTO Walter  05/03/22 1131

## 2022-05-02 NOTE — H&P
ED Observation Unit  History and Physical      I assumed care of this patient from the Main ED at onset of observation time, 1620 on 05/02/2022.       History of Present Illness:    Erin Kelley is a 67 y.o. female with glaucoma and GERD who presented to the ER today with left-sided back and chest wall pain.  She was concerned this was a  possible allergic reaction.  Patient states that she had read side effect warnings of statins and hence did not want to try.  She states that her doctor wrote her for this.  She states that she had 2 doses, Thursday and Friday.  She states since this time she noted some left upper back pain that radiated to left chest.  She also reported possible rash to right forearm.  She took Benadryl shortly before arrival with improvement rash.  She does continue with left upper back pain.  Denies any recent falls or trauma.  States that she was concerned about possible cause of her heart given the radiation.  Denies any shortness of breath, nausea, vomiting or diaphoresis.  She is a daily smoker    ED workup   CPK mildly elevated.  EKG with nonspecific ST wave changes.  No previous to compare.   Patient was admitted to the ED OU for chest pain rule out.    I reviewed the ED Provider Note dated 5/2/22 prior to my evaluation of this patient.  I reviewed all labs and imaging performed in the Main ED, prior to patient being placed in Observation.   PMHx   Past Medical History:   Diagnosis Date    GERD (gastroesophageal reflux disease)     Glaucoma       Past Surgical History:   Procedure Laterality Date    FOOT SURGERY      TUBAL LIGATION      TUMOR REMOVAL Right     Right breast        Family Hx   History reviewed. No pertinent family history.     Social Hx   Social History     Socioeconomic History    Marital status: Unknown   Tobacco Use    Smoking status: Current Every Day Smoker    Smokeless tobacco: Never Used   Substance and Sexual Activity    Alcohol use: No    Drug use: No         Vital Signs   Vitals:    05/02/22 1324 05/02/22 1728 05/02/22 1800 05/02/22 1814   BP: 106/66   135/61   BP Location:    Left arm   Patient Position:    Lying   Pulse: 60 67 73 75   Resp: 18      Temp: 98.6 °F (37 °C)   98 °F (36.7 °C)   TempSrc:    Oral   SpO2: 100%   98%        Review of Systems  Review of Systems   Constitutional: Negative for chills, diaphoresis and fever.   HENT: Negative for congestion.    Eyes: Negative for blurred vision.   Respiratory: Negative for cough and shortness of breath.    Cardiovascular: Positive for chest pain.   Gastrointestinal: Negative for nausea and vomiting.   Genitourinary: Negative for dysuria.   Musculoskeletal: Positive for back pain and myalgias.   Skin: Negative for rash.   Neurological: Negative for dizziness and headaches.   Psychiatric/Behavioral: The patient is not nervous/anxious.        Physical Exam  Physical Exam  Constitutional:       Appearance: Normal appearance. She is not ill-appearing or diaphoretic.   HENT:      Head: Normocephalic and atraumatic.   Eyes:      Extraocular Movements: Extraocular movements intact.      Conjunctiva/sclera: Conjunctivae normal.   Cardiovascular:      Rate and Rhythm: Normal rate and regular rhythm.   Pulmonary:      Effort: No respiratory distress.   Abdominal:      Palpations: Abdomen is soft.   Musculoskeletal:         General: Normal range of motion.      Cervical back: Normal range of motion and neck supple.        Back:    Skin:     General: Skin is warm and dry.   Neurological:      Mental Status: She is alert and oriented to person, place, and time.   Psychiatric:         Mood and Affect: Mood normal.         Thought Content: Thought content normal.         Medications:   Scheduled Meds:   [START ON 5/3/2022] aspirin  81 mg Oral Daily    LIDOcaine  1 patch Transdermal Q24H    [START ON 5/3/2022] pantoprazole  40 mg Oral Daily     Continuous Infusions:  PRN Meds:.acetaminophen, cyclobenzaprine, gabapentin,  ketorolac, melatonin, sodium chloride 0.9%      Assessment/Plan:    1) atypical chest pain:  Appears musculoskeletal but given risk factors will obtain serial troponins, place cardiac monitoring.    Case was discussed with the ED provider, DAGOBERTO Mendez.

## 2022-05-02 NOTE — ED NOTES
Per Monitor tech Ledy, pt unable to be placed on cardiac monitoring at this time d/t telebox monitoring shortage.  Ledy will notify when boxes become available.

## 2022-05-03 VITALS
HEART RATE: 75 BPM | RESPIRATION RATE: 18 BRPM | SYSTOLIC BLOOD PRESSURE: 100 MMHG | TEMPERATURE: 98 F | DIASTOLIC BLOOD PRESSURE: 50 MMHG | OXYGEN SATURATION: 97 %

## 2022-05-03 LAB — TROPONIN I SERPL DL<=0.01 NG/ML-MCNC: 0.01 NG/ML (ref 0–0.03)

## 2022-05-03 PROCEDURE — 25000003 PHARM REV CODE 250: Performed by: PHYSICIAN ASSISTANT

## 2022-05-03 PROCEDURE — 84484 ASSAY OF TROPONIN QUANT: CPT | Performed by: PHYSICIAN ASSISTANT

## 2022-05-03 PROCEDURE — G0378 HOSPITAL OBSERVATION PER HR: HCPCS

## 2022-05-03 RX ADMIN — ASPIRIN 81 MG: 81 TABLET, COATED ORAL at 08:05

## 2022-05-03 RX ADMIN — PANTOPRAZOLE SODIUM 40 MG: 40 TABLET, DELAYED RELEASE ORAL at 08:05

## 2022-05-03 NOTE — DISCHARGE SUMMARY
[facility] ED Observation Unit  Discharge Summary        History of Present Illness:    Erin Kelley is a 67 y.o. female with glaucoma and GERD who presented to the ER today with left-sided back and chest wall pain.  She was concerned this was a  possible allergic reaction.  Patient states that she had read side effect warnings of statins and hence did not want to try.  She states that her doctor wrote her for this.  She states that she had 2 doses, Thursday and Friday.  She states since this time she noted some left upper back pain that radiated to left chest.  She also reported possible rash to right forearm.  She took Benadryl shortly before arrival with improvement rash.  She does continue with left upper back pain.  Denies any recent falls or trauma.  States that she was concerned about possible cause of her heart given the radiation.  Denies any shortness of breath, nausea, vomiting or diaphoresis.  She is a daily smoker     ED workup   CPK mildly elevated.  EKG with nonspecific ST wave changes.  No previous to compare.   Patient was admitted to the ED OU for chest pain rule out.     Observation Course:    Trop negative x 3. Reported significant improvement in her upper back pain.      Brief Physical Exam/Reassessment   Review of Systems   Constitutional: Negative for chills and fever.   Cardiovascular: Negative for chest pain (Resolved) and palpitations.   Musculoskeletal: Positive for back pain (Improved). Negative for myalgias.   Neurological: Negative for dizziness and headaches.   All other systems reviewed and are negative.      Physical Exam    Nursing note and vitals reviewed.  Constitutional: Vital signs are normal. She appears well-developed and well-nourished. She does not appear ill. No distress.   Neck: Neck supple.   Cardiovascular: Normal rate, regular rhythm, S1 normal, S2 normal and normal heart sounds. Exam reveals no gallop.    No murmur heard.  Pulmonary/Chest: Effort normal and breath  sounds normal. No tachypnea. She has no decreased breath sounds. She has no wheezes. She exhibits tenderness. She exhibits no crepitus, no edema and no swelling.     Abdominal: Abdomen is soft and flat. There is no abdominal tenderness.   Musculoskeletal:      Cervical back: Neck supple.      Thoracic back: Spasms and tenderness present.        Back:      Neurological: She is alert and oriented to person, place, and time. GCS eye subscore is 4. GCS verbal subscore is 5. GCS motor subscore is 6.   Skin: Skin is warm, dry and intact.   Psychiatric: She has a normal mood and affect. Her behavior is normal.         Consultants:    n/a    ED/OBS Workup:  Vitals:    05/03/22 0820 05/03/22 1000 05/03/22 1157 05/03/22 1200   BP: (!) 107/51  (!) 100/50    Pulse: 66 68 65 75   Resp: 18  18    Temp: 98.4 °F (36.9 °C)  98.4 °F (36.9 °C)    TempSrc: Oral  Oral    SpO2: 97%  (!) 93%     05/03/22 1229   BP:    Pulse: 75   Resp:    Temp:    TempSrc:    SpO2: 97%       Labs Reviewed   CBC W/ AUTO DIFFERENTIAL - Abnormal; Notable for the following components:       Result Value    RBC 3.83 (*)     MCH 31.6 (*)     All other components within normal limits   COMPREHENSIVE METABOLIC PANEL - Abnormal; Notable for the following components:    CO2 20 (*)     All other components within normal limits   CK - Abnormal; Notable for the following components:     (*)     All other components within normal limits   TROPONIN I   TROPONIN I   TROPONIN I   SARS-COV-2 RDRP GENE       X-Ray Chest AP Portable   Final Result      Prominent perihilar pulmonary vasculature without overt edema.         Electronically signed by: Margaret Leyva   Date:    05/02/2022   Time:    14:06          Final Diagnosis:  1. Musculoskeletal back pain    2. Medication side effect        Plan:  Discharge home instructed to use over-the-counter Tylenol/Motrin.    Discharge Condition: Good    Disposition: Home or Self Care     Time spent on the discharge of the  patient including review of hospital course with the patient. reviewing discharge medications and arranging follow-up care 35 minutes.  Patient was seen and examined on the date of discharge and determined to be suitable for discharge.    Follow Up:   ED Disposition Condition    Discharge Stable          ED Prescriptions     None        Follow-up Information     Follow up With Specialties Details Why Contact Info    Brandi Barros MD Family Medicine Schedule an appointment as soon as possible for a visit in 3 days  [address] [facility]  [address]  [facility] [id number]  [phone]      [facility] - Emergency Dept (Observation) Emergency Medicine Go to  If symptoms worsen [address] [facility] [id number] [phone]          No future appointments.